# Patient Record
Sex: MALE | Race: WHITE | NOT HISPANIC OR LATINO | Employment: OTHER | URBAN - METROPOLITAN AREA
[De-identification: names, ages, dates, MRNs, and addresses within clinical notes are randomized per-mention and may not be internally consistent; named-entity substitution may affect disease eponyms.]

---

## 2024-11-05 ENCOUNTER — HOSPITAL ENCOUNTER (OUTPATIENT)
Dept: RADIOLOGY | Facility: CLINIC | Age: 79
Discharge: HOME | End: 2024-11-05
Payer: MEDICARE

## 2024-11-05 ENCOUNTER — OFFICE VISIT (OUTPATIENT)
Dept: URGENT CARE | Facility: CLINIC | Age: 79
End: 2024-11-05
Payer: MEDICARE

## 2024-11-05 VITALS
HEART RATE: 73 BPM | TEMPERATURE: 98.1 F | DIASTOLIC BLOOD PRESSURE: 79 MMHG | SYSTOLIC BLOOD PRESSURE: 124 MMHG | OXYGEN SATURATION: 94 % | RESPIRATION RATE: 12 BRPM

## 2024-11-05 DIAGNOSIS — R91.8 ABNORMALITY OF LUNG ON CHEST X-RAY: Primary | ICD-10-CM

## 2024-11-05 DIAGNOSIS — R05.1 ACUTE COUGH: ICD-10-CM

## 2024-11-05 DIAGNOSIS — B34.9 NONSPECIFIC SYNDROME SUGGESTIVE OF VIRAL ILLNESS: ICD-10-CM

## 2024-11-05 LAB
POC CORONAVIRUS 2019 BY PCR (COV19): NOT DETECTED
POC FLU A RESULT: NOT DETECTED
POC FLU B RESULT: NOT DETECTED
POC RSV PCR: NOT DETECTED

## 2024-11-05 PROCEDURE — 71046 X-RAY EXAM CHEST 2 VIEWS: CPT

## 2024-11-05 PROCEDURE — 71046 X-RAY EXAM CHEST 2 VIEWS: CPT | Performed by: RADIOLOGY

## 2024-11-05 PROCEDURE — 99214 OFFICE O/P EST MOD 30 MIN: CPT

## 2024-11-05 PROCEDURE — 99213 OFFICE O/P EST LOW 20 MIN: CPT

## 2024-11-05 RX ORDER — CARVEDILOL 12.5 MG/1
12.5 TABLET ORAL 2 TIMES DAILY
COMMUNITY

## 2024-11-05 RX ORDER — LISINOPRIL 40 MG/1
40 TABLET ORAL
COMMUNITY

## 2024-11-05 RX ORDER — ATORVASTATIN CALCIUM 40 MG/1
40 TABLET, FILM COATED ORAL
COMMUNITY
Start: 2024-02-08

## 2024-11-05 RX ORDER — MULTIVITAMIN
1 TABLET ORAL
COMMUNITY

## 2024-11-05 RX ORDER — CHLORTHALIDONE 25 MG/1
25 TABLET ORAL
COMMUNITY
Start: 2024-02-08

## 2024-11-05 RX ORDER — METFORMIN HYDROCHLORIDE 750 MG/1
750 TABLET, EXTENDED RELEASE ORAL
COMMUNITY
Start: 2024-02-08

## 2024-11-05 RX ORDER — ASPIRIN 81 MG/1
1 TABLET ORAL
COMMUNITY

## 2024-11-05 RX ORDER — AMLODIPINE BESYLATE 5 MG/1
1 TABLET ORAL DAILY
COMMUNITY
Start: 2024-02-08

## 2024-11-05 RX ORDER — TRAMADOL HYDROCHLORIDE 50 MG/1
TABLET ORAL
COMMUNITY
Start: 2024-10-16

## 2024-11-05 RX ORDER — VIT C/E/ZN/COPPR/LUTEIN/ZEAXAN 250MG-90MG
1 CAPSULE ORAL
COMMUNITY

## 2024-11-05 NOTE — LETTER
November 5, 2024     No Assigned PCP Generic Provider, MD  None  [place] [address]    Patient: Jonah Beltran   YOB: 1945   Date of Visit: 11/5/2024        Dear No Assigned PCP Generic Provider, MD:    Your patient, Jonah Beltran was seen in our urgent care department on 11/5/2024. Enclosed is a full report of that visit.    If you have any questions or concerns, please don't hesitate to call.           Sincerely,        Lisa Benton PA-C      CC: No Recipients       Enclosure

## 2024-11-05 NOTE — PROGRESS NOTES
[facility] URGENT CARE MAYKEL NOTE:      Name: Jonah Beltran, 79 y.o.    CSN:[id number]   MRN:[id number]    PCP: No Assigned PCP Generic Provider, MD    ALL:  No Known Allergies    History:    Chief Complaint: Cough, Sore Throat, and weakness (Runny nose x 6 days)    Encounter Date: 11/5/2024      HPI: The history was obtained from the patient. Jonah is a 79 y.o. male, who presents with a chief complaint of Cough, Sore Throat, and weakness (Runny nose x 6 days).  Patient presents with the above symptoms for the last 6 days.  Patient states symptoms have remained persistent.  He states his cough is productive in nature.  He states he has no history of smoking, COPD or asthma.  He denies fevers, chills, nausea, vomiting, chest pain, shortness of breath, abdominal pain.      PMHx:    History reviewed. No pertinent past medical history.           Current Outpatient Medications   Medication Sig Dispense Refill    ALBUTEROL INHL Inhale.      amLODIPine (Norvasc) 5 mg tablet Take 1 tablet (5 mg) by mouth once daily.      aspirin 81 mg EC tablet Take 1 tablet (81 mg) by mouth once daily.      atorvastatin (Lipitor) 40 mg tablet Take 1 tablet (40 mg) by mouth.      carvedilol (Coreg) 12.5 mg tablet Take 1 tablet (12.5 mg) by mouth twice a day.      chlorthalidone (Hygroton) 25 mg tablet Take 1 tablet (25 mg) by mouth.      cholecalciferol (Vitamin D-3) 25 MCG (1000 UT) capsule Take 1 tablet by mouth once daily.      empagliflozin (Jardiance) 25 mg Take 0.5 tablets (12.5 mg) by mouth once daily.      lisinopril 40 mg tablet Take 1 tablet (40 mg) by mouth once daily.      metFORMIN XR (Glucophage-XR) 750 mg 24 hr tablet Take 1 tablet (750 mg) by mouth.      multivitamin tablet Take 1 tablet by mouth once daily.      traMADol (Ultram) 50 mg tablet Take by mouth.       No current facility-administered medications for this visit.         PMSx:    Past Surgical History:   Procedure Laterality Date    TOE SURGERY          Fam Hx: No family history on file.    SOC. Hx:     Social History     Socioeconomic History    Marital status: Single     Spouse name: Not on file    Number of children: Not on file    Years of education: Not on file    Highest education level: Not on file   Occupational History    Not on file   Tobacco Use    Smoking status: Never    Smokeless tobacco: Never   Substance and Sexual Activity    Alcohol use: Not on file    Drug use: Not on file    Sexual activity: Not on file   Other Topics Concern    Not on file   Social History Narrative    Not on file     Social Drivers of Health     Financial Resource Strain: Not on file   Food Insecurity: Not on file   Transportation Needs: Not on file   Physical Activity: Not on file   Stress: Not on file   Social Connections: Not on file   Intimate Partner Violence: Not on file   Housing Stability: Not on file         Vitals:    11/05/24 1403   BP: 124/79   Pulse: 73   Resp: 12   Temp: 36.7 °C (98.1 °F)   SpO2: 94%                Physical Exam  Vitals reviewed.   Constitutional:       General: He is not in acute distress.     Appearance: Normal appearance. He is not ill-appearing.   HENT:      Head: Normocephalic and atraumatic.      Right Ear: External ear normal.      Left Ear: External ear normal.      Nose: Congestion present.      Mouth/Throat:      Mouth: Mucous membranes are moist.      Pharynx: Oropharynx is clear. Posterior oropharyngeal erythema present.   Eyes:      Extraocular Movements: Extraocular movements intact.      Conjunctiva/sclera: Conjunctivae normal.      Pupils: Pupils are equal, round, and reactive to light.   Cardiovascular:      Rate and Rhythm: Normal rate and regular rhythm.      Pulses: Normal pulses.      Heart sounds: Normal heart sounds. No murmur heard.  Pulmonary:      Effort: Pulmonary effort is normal. No respiratory distress.      Breath sounds: Normal breath sounds. No stridor. No wheezing, rhonchi or rales.   Chest:      Chest  wall: No tenderness.   Abdominal:      General: Abdomen is flat.      Palpations: Abdomen is soft.   Musculoskeletal:      Cervical back: Normal range of motion and neck supple. No tenderness.   Lymphadenopathy:      Cervical: No cervical adenopathy.   Skin:     General: Skin is warm.      Capillary Refill: Capillary refill takes less than 2 seconds.      Findings: No rash.   Neurological:      General: No focal deficit present.      Mental Status: He is alert and oriented to person, place, and time.   Psychiatric:         Mood and Affect: Mood normal.         Behavior: Behavior normal.       I did personally review Jonah's past medical history, surgical history, social history, as well as family history (when relevant).  In this case, I also oversaw the his drug management by reviewing his medication list, allergy list, as well as the medications that I prescribed during the UC course and/or recommended as an out-patient (including possible OTC medications such as acetaminophen, NSAIDs , etc).    After reviewing the items above, I did look at previous medical documentation, such as recent hospitalizations, office visits, and/or recent consultations with PCP/specialist.                          SDOH:   Another factor that I considered in Jonah's care was his Social Determinants of Health (SDOH). During this UC encounter, he did not have social determinants of health. Those SDOH influencing Jonah's care are: none    LABORATORY @ RADIOLOGICAL IMAGING (if done):     Study Result    Narrative & Impression   Interpreted By:  Sravanthi Amor,   STUDY:  XR CHEST 2 VIEWS;  11/5/2024 2:38 pm      INDICATION:  Signs/Symptoms:cough x 6 days; SPO2 at 94%.      COMPARISON:  None.      ACCESSION NUMBER(S):  [id number]      ORDERING CLINICIAN:  GRETA CHRISTENSEN      FINDINGS:  The heart is normal in size.      The lung markings are coarse.      There is a 1.4 centimeters nodular density in the left upper lobe.      There is no  obvious pleural fluid.      There is tortuosity of the aorta. There are degenerative changes of  the spine.      COMPARISON OF FINDING:      IMPRESSION:  1.4 centimeters density in the left upper lobe. Correlation with a  chest CT may be useful.      Coarse interstitial lung disease.      MACRO:  none      Signed by: Sravanthi Amor 11/5/2024 2:43 PM  Dictation workstation:   [id number]        COURSE/MEDICAL DECISION MAKING:    Jonah is a 79 y.o., who presents with a working diagnosis of   1. Abnormality of lung on chest x-ray    2. Acute cough    3. Nonspecific syndrome suggestive of viral illness      Jonah was seen today for cough, sore throat and weakness.  Diagnoses and all orders for this visit:  Abnormality of lung on chest x-ray (Primary)  Acute cough  -     XR chest 2 views; Future  -     POCT SARS-COV-2/FLU/RSV PCR SYMPTOMATIC manually resulted  Nonspecific syndrome suggestive of viral illness  Presents with a 6-day onset of cough, sore throat and runny nose.  Patient denies persistent fevers.  He is afebrile in clinic today.  Patient is nontachycardic and nonhypoxic.  Patient does not appear labored or short of breath in patient room.  This x-ray was performed and was notable for a 1.4 cm lesion to the left upper lung lobe.  Patient had a chest x-ray in January 2021 that showed the same lesion at 5 mm.  This finding was discussed with patient at bedside.  Strongly recommend patient follow-up with primary care provider within the next 3 days or sooner as he likely needs further imaging of this finding.  [facility] and he will give them a call in the morning to set up an appointment.    After my independent evaluation, he appears to have a self-limiting illness likely due to a viral sydnrome.   At this time, there is a no evidence of pneumonia, hypoxia, OM, bacterial sinus infection, bacterial bronchitis, bacteremia, or sepsis.     As we discussed, he is to return to  "our office or ER immediately if there is any worsening of his condition, such as increased cough, shortness of breath, persistent fevers, repeated vomiting, dehydration, or if his condition worsens at all.    Susy Benton PA-C   Advanced Practice Provider  [facility] URGENT CARE    Please note: Portions of this chart may have been created with Dragon voice recognition software. Occasional wrong-word or \"sound-like\" substitutions may have occurred due to inherent limitations of the voice recognition software. Please excuse any typographical or grammatical errors contained herein. Please read the chart carefully and recognize, using context, where the substitutions have occurred.   "

## 2024-11-05 NOTE — PATIENT INSTRUCTIONS
Today you were seen for symptoms such as cough, sore throat, runny nose for the last 6 days. A chest xray was performed which showed a 1.5 cm lesion in the left upper lobe of your lung. It appears this lesion has increased in size from a previous chest xray you received in 2021. You should take this information to your primary care provider for prompt follow up. You should be seen by primary care in the next 72 hours or sooner if needed. You should return to our office or ER immediately if there is any worsening of your condition, such as increased cough, shortness of breath, persistent fevers, repeated vomiting, dehydration, or if your condition worsens at all.

## 2024-11-12 ENCOUNTER — OFFICE VISIT (OUTPATIENT)
Dept: URGENT CARE | Facility: CLINIC | Age: 79
End: 2024-11-12
Payer: MEDICARE

## 2024-11-12 VITALS
SYSTOLIC BLOOD PRESSURE: 119 MMHG | BODY MASS INDEX: 37.51 KG/M2 | HEIGHT: 73 IN | RESPIRATION RATE: 16 BRPM | OXYGEN SATURATION: 97 % | DIASTOLIC BLOOD PRESSURE: 71 MMHG | HEART RATE: 84 BPM | TEMPERATURE: 97.7 F | WEIGHT: 283 LBS

## 2024-11-12 DIAGNOSIS — J18.0 BRONCHOPNEUMONIA: Primary | ICD-10-CM

## 2024-11-12 PROCEDURE — 99212 OFFICE O/P EST SF 10 MIN: CPT | Performed by: PHYSICIAN ASSISTANT

## 2024-11-12 RX ORDER — AZITHROMYCIN 250 MG/1
TABLET, FILM COATED ORAL
Qty: 6 TABLET | Refills: 0 | Status: SHIPPED | OUTPATIENT
Start: 2024-11-12 | End: 2024-11-17

## 2024-11-12 NOTE — PROGRESS NOTES
[facility] URGENT CARE   MAYKEL NOTE:      Name: Jonah Beltran, 79 y.o.    CSN:[id number]   MRN:[id number]    PCP: No Assigned PCP Generic Provider, MD    ALL:  No Known Allergies    History:    Chief Complaint: Nasal Congestion (Congestion and sinus pressure for 2 weeks.)    Encounter Date: 11/12/2024      HPI: The history was obtained from the patient. Jonah is a 79 y.o. male, who presents with a chief complaint of Nasal Congestion (Congestion and sinus pressure for 2 weeks.) he was here 11 5, had x-ray performed not showing any signs of pneumonia, has had a progressive cough is now productive green-brownish sputum, denies any active fevers or notable exertional dyspnea or peripheral edema.    PMHx:    No past medical history on file.           Current Outpatient Medications   Medication Sig Dispense Refill    ALBUTEROL INHL Inhale.      amLODIPine (Norvasc) 5 mg tablet Take 1 tablet (5 mg) by mouth once daily.      aspirin 81 mg EC tablet Take 1 tablet (81 mg) by mouth once daily.      atorvastatin (Lipitor) 40 mg tablet Take 1 tablet (40 mg) by mouth.      azithromycin (Zithromax) 250 mg tablet Take 2 tablets (500 mg) on  Day 1,  followed by 1 tablet (250 mg) once daily on Days 2 through 5. 6 tablet 0    carvedilol (Coreg) 12.5 mg tablet Take 1 tablet (12.5 mg) by mouth twice a day.      chlorthalidone (Hygroton) 25 mg tablet Take 1 tablet (25 mg) by mouth.      cholecalciferol (Vitamin D-3) 25 MCG (1000 UT) capsule Take 1 tablet by mouth once daily.      empagliflozin (Jardiance) 25 mg Take 0.5 tablets (12.5 mg) by mouth once daily.      lisinopril 40 mg tablet Take 1 tablet (40 mg) by mouth once daily.      metFORMIN XR (Glucophage-XR) 750 mg 24 hr tablet Take 1 tablet (750 mg) by mouth.      multivitamin tablet Take 1 tablet by mouth once daily.      traMADol (Ultram) 50 mg tablet Take by mouth.       No current facility-administered medications for this visit.         PMSx:    Past  Surgical History:   Procedure Laterality Date    TOE SURGERY         Fam Hx: No family history on file.    SOC. Hx:     Social History     Socioeconomic History    Marital status: Single     Spouse name: Not on file    Number of children: Not on file    Years of education: Not on file    Highest education level: Not on file   Occupational History    Not on file   Tobacco Use    Smoking status: Never    Smokeless tobacco: Never   Substance and Sexual Activity    Alcohol use: Not on file    Drug use: Not on file    Sexual activity: Not on file   Other Topics Concern    Not on file   Social History Narrative    Not on file     Social Drivers of Health     Financial Resource Strain: Not on file   Food Insecurity: Not on file   Transportation Needs: Not on file   Physical Activity: Not on file   Stress: Not on file   Social Connections: Not on file   Intimate Partner Violence: Not on file   Housing Stability: Not on file         Vitals:    11/12/24 1359   BP: 119/71   Pulse: 84   Resp: 16   Temp: 36.5 °C (97.7 °F)   SpO2: 97%     128 kg (283 lb)          Physical Exam  Vitals reviewed.   Constitutional:       Appearance: Normal appearance. He is obese.   HENT:      Head: Normocephalic and atraumatic.      Nose: Nose normal.      Mouth/Throat:      Mouth: Mucous membranes are moist.   Eyes:      Extraocular Movements: Extraocular movements intact.   Cardiovascular:      Rate and Rhythm: Normal rate and regular rhythm.   Pulmonary:      Effort: Pulmonary effort is normal.      Breath sounds: Normal breath sounds.   Abdominal:      General: Abdomen is flat.   Musculoskeletal:         General: Normal range of motion.      Cervical back: Normal range of motion and neck supple.      Right lower leg: No edema.      Left lower leg: No edema.   Skin:     General: Skin is warm.      Findings: No rash.   Neurological:      Mental Status: He is alert and oriented to person, place, and time.   Psychiatric:         Behavior: Behavior  normal.           LABORATORY @ RADIOLOGICAL IMAGING (if done):     Reviewed x-ray from 11/5/2044.  ____________________________________________________________________    I did personally review Jonah's past medical history, surgical history, social history, as well as family history (when relevant).  In this case, I also oversaw the his drug management by reviewing his medication list, allergy list, as well as the medications that I prescribed during the UC course and/or recommended as an out-patient (including possible OTC medications such as acetaminophen, NSAIDs , etc).    After reviewing the items above, I did look at previous medical documentation, such as recent hospitalizations, office visits, and/or recent consultations with PCP/specialist.                          SDOH:   Another factor that I considered in Jonah's care was his Social Determinants of Health (SDOH). During this UC encounter, he did not have social determinants of health. Those SDOH influencing Jonah's care are: none      _____________________________________________________________________      UC COURSE/MEDICAL DECISION MAKING:    Jonah is a 79 y.o., who presents with a working diagnosis of   1. Bronchopneumonia      Current plan is to treat with Zithromax, encouraged use of Mucinex over-the-counter, he was agreeable to this plan, vital signs reviewed patient is generally well-appearing would do well as an outpatient.  We discussed discharge.      Corey Herrera PA-C   Advanced Practice Provider  [facility] URGENT CARE    Please note: While the patient may or may not have received printed discharge paperwork, all relevant medical findings, test results, and treatment details are accessible through the electronic medical record system. The patient is encouraged to review their chart via the patient portal for comprehensive information and follow-up instructions.

## 2025-02-25 ENCOUNTER — OFFICE VISIT (OUTPATIENT)
Dept: URGENT CARE | Facility: CLINIC | Age: 80
End: 2025-02-25
Payer: MEDICARE

## 2025-02-25 VITALS
DIASTOLIC BLOOD PRESSURE: 70 MMHG | OXYGEN SATURATION: 95 % | HEART RATE: 69 BPM | TEMPERATURE: 97.8 F | RESPIRATION RATE: 14 BRPM | SYSTOLIC BLOOD PRESSURE: 122 MMHG

## 2025-02-25 DIAGNOSIS — R30.0 DYSURIA: ICD-10-CM

## 2025-02-25 DIAGNOSIS — N30.01 ACUTE CYSTITIS WITH HEMATURIA: Primary | ICD-10-CM

## 2025-02-25 LAB
POC APPEARANCE, URINE: CLEAR
POC BILIRUBIN, URINE: NEGATIVE
POC BLOOD, URINE: ABNORMAL
POC COLOR, URINE: ABNORMAL
POC GLUCOSE, URINE: ABNORMAL MG/DL
POC KETONES, URINE: NEGATIVE MG/DL
POC LEUKOCYTES, URINE: ABNORMAL
POC NITRITE,URINE: NEGATIVE
POC PH, URINE: 5.5 PH
POC PROTEIN, URINE: ABNORMAL MG/DL
POC SPECIFIC GRAVITY, URINE: 1.02
POC UROBILINOGEN, URINE: 0.2 EU/DL

## 2025-02-25 PROCEDURE — 81002 URINALYSIS NONAUTO W/O SCOPE: CPT | Performed by: PHYSICIAN ASSISTANT

## 2025-02-25 PROCEDURE — 99212 OFFICE O/P EST SF 10 MIN: CPT | Performed by: PHYSICIAN ASSISTANT

## 2025-02-25 RX ORDER — CEFUROXIME AXETIL 250 MG/1
250 TABLET ORAL 2 TIMES DAILY
Qty: 14 TABLET | Refills: 0 | Status: SHIPPED | OUTPATIENT
Start: 2025-02-25 | End: 2025-03-04

## 2025-02-25 NOTE — PROGRESS NOTES
[facility] URGENT CARE   MAYKEL NOTE:      Name: Jonah Beltran, 79 y.o.    CSN:[id number]   MRN:[id number]    PCP: No Assigned PCP Generic Provider, MD    ALL:  No Known Allergies    History:    Chief Complaint: UTI (Decreased stream, dysuria x 3 days )    Encounter Date: 2/25/2025  13:40    HPI: The history was obtained from the patient. Jonah is a 79 y.o. male, who presents with a chief complaint of UTI (Decreased stream, dysuria x 3 days )    PMHx:    No past medical history on file.           Current Outpatient Medications   Medication Sig Dispense Refill    ALBUTEROL INHL Inhale.      amLODIPine (Norvasc) 5 mg tablet Take 1 tablet (5 mg) by mouth once daily.      aspirin 81 mg EC tablet Take 1 tablet (81 mg) by mouth once daily.      atorvastatin (Lipitor) 40 mg tablet Take 1 tablet (40 mg) by mouth.      carvedilol (Coreg) 12.5 mg tablet Take 1 tablet (12.5 mg) by mouth twice a day.      chlorthalidone (Hygroton) 25 mg tablet Take 1 tablet (25 mg) by mouth.      cholecalciferol (Vitamin D-3) 25 MCG (1000 UT) capsule Take 1 tablet by mouth once daily.      empagliflozin (Jardiance) 25 mg Take 0.5 tablets (12.5 mg) by mouth once daily.      lisinopril 40 mg tablet Take 1 tablet (40 mg) by mouth once daily.      metFORMIN XR (Glucophage-XR) 750 mg 24 hr tablet Take 1 tablet (750 mg) by mouth.      multivitamin tablet Take 1 tablet by mouth once daily.      traMADol (Ultram) 50 mg tablet Take by mouth.      cefuroxime (Ceftin) 250 mg tablet Take 1 tablet (250 mg) by mouth 2 times a day for 7 days. 14 tablet 0     No current facility-administered medications for this visit.         PMSx:    Past Surgical History:   Procedure Laterality Date    TOE SURGERY         Fam Hx: No family history on file.    SOC. Hx:     Social History     Socioeconomic History    Marital status: Single     Spouse name: Not on file    Number of children: Not on file    Years of education: Not on file    Highest education  level: Not on file   Occupational History    Not on file   Tobacco Use    Smoking status: Never    Smokeless tobacco: Never   Substance and Sexual Activity    Alcohol use: Not on file    Drug use: Not on file    Sexual activity: Not on file   Other Topics Concern    Not on file   Social History Narrative    Not on file     Social Drivers of Health     Financial Resource Strain: Not on file   Food Insecurity: Not on file   Transportation Needs: Not on file   Physical Activity: Not on file   Stress: Not on file   Social Connections: Not on file   Intimate Partner Violence: Not on file   Housing Stability: Not on file         Vitals:    02/25/25 1317   BP: 122/70   Pulse: 69   Resp: 14   Temp: 36.6 °C (97.8 °F)   SpO2: 95%                Physical Exam  Vitals reviewed.   Constitutional:       Appearance: Normal appearance. He is normal weight.   HENT:      Head: Normocephalic and atraumatic.   Eyes:      Extraocular Movements: Extraocular movements intact.      Pupils: Pupils are equal, round, and reactive to light.   Pulmonary:      Effort: Pulmonary effort is normal.   Abdominal:      General: Abdomen is flat. Bowel sounds are normal. There is no distension.   Genitourinary:     Comments: Bladder scan 68mL postvoid  Musculoskeletal:         General: Normal range of motion.      Cervical back: Normal range of motion.   Skin:     General: Skin is warm.      Capillary Refill: Capillary refill takes less than 2 seconds.   Neurological:      Mental Status: He is alert.           LABORATORY @ RADIOLOGICAL IMAGING (if done):     Results for orders placed or performed in visit on 02/25/25 (from the past 24 hours)   POCT UA (nonautomated w/o microscopy) manually resulted   Result Value Ref Range    POC Color, Urine Other (A) Straw, Yellow, Light-Yellow    POC Appearance, Urine Clear Clear    POC Glucose, Urine >=1000 (4+) (A) NEGATIVE mg/dl    POC Bilirubin, Urine NEGATIVE NEGATIVE    POC Ketones, Urine NEGATIVE NEGATIVE  mg/dl    POC Specific Gravity, Urine 1.020 1.005 - 1.035    POC Blood, Urine LARGE (3+) (A) NEGATIVE    POC PH, Urine 5.5 No Reference Range Established PH    POC Protein, Urine 100 (2+) (A) NEGATIVE mg/dl    POC Urobilinogen, Urine 0.2 0.2, 1.0 EU/DL    Poc Nitrite, Urine NEGATIVE NEGATIVE    POC Leukocytes, Urine TRACE (A) NEGATIVE       ____________________________________________________________________    I did personally review Jonah's past medical history, surgical history, social history, as well as family history (when relevant).  In this case, I also oversaw the his drug management by reviewing his medication list, allergy list, as well as the medications that I prescribed during the UC course and/or recommended as an out-patient (including possible OTC medications such as acetaminophen, NSAIDs , etc).    After reviewing the items above, I did not look at previous medical documentation, such as recent hospitalizations, office visits, and/or recent consultations with PCP/specialist.                          SDOH:   Another factor that I considered in Jonah's care was his Social Determinants of Health (SDOH). During this UC encounter, he did not have social determinants of health. Those SDOH influencing Jonah's care are: none      _____________________________________________________________________      UC COURSE/MEDICAL DECISION MAKING:    Jonah is a 79 y.o., who presents with a working diagnosis of   1. Acute cystitis with hematuria    2. Dysuria      Tx with ceftin BID x7d, await culture results; bladder scan reveals no AUR      Corey Herrera PA-C   Advanced Practice Provider  [facility] URGENT CARE    Please note: While the patient may or may not have received printed discharge paperwork, all relevant medical findings, test results, and treatment details are accessible through the electronic medical record system. The patient is encouraged to review their chart via the  patient portal for comprehensive information and follow-up instructions.

## 2025-02-27 ENCOUNTER — TELEPHONE (OUTPATIENT)
Dept: URGENT CARE | Facility: CLINIC | Age: 80
End: 2025-02-27
Payer: MEDICARE

## 2025-02-27 LAB — BACTERIA UR CULT: ABNORMAL

## 2025-02-27 NOTE — TELEPHONE ENCOUNTER
Result Communication    No results found from the In Basket message.    2:22 PM      Results were not successfully communicated with the patient and they acknowledged their understanding.

## 2025-02-27 NOTE — RESULT ENCOUNTER NOTE
Please call the patient regarding his abnormal result.  Please indicate he is on appropriate medication to cover for this bacteria.

## 2025-02-28 ENCOUNTER — TELEPHONE (OUTPATIENT)
Dept: URGENT CARE | Facility: CLINIC | Age: 80
End: 2025-02-28
Payer: MEDICARE

## 2025-02-28 NOTE — TELEPHONE ENCOUNTER
Result Communication    No results found from the In Basket message.    12:03 PM      Results were successfully communicated with the patient and they acknowledged their understanding.

## 2025-03-18 ENCOUNTER — OFFICE VISIT (OUTPATIENT)
Dept: URGENT CARE | Facility: CLINIC | Age: 80
End: 2025-03-18
Payer: MEDICARE

## 2025-03-18 VITALS
DIASTOLIC BLOOD PRESSURE: 64 MMHG | SYSTOLIC BLOOD PRESSURE: 108 MMHG | BODY MASS INDEX: 37.51 KG/M2 | OXYGEN SATURATION: 97 % | TEMPERATURE: 97.7 F | HEIGHT: 73 IN | WEIGHT: 283 LBS | HEART RATE: 70 BPM | RESPIRATION RATE: 16 BRPM

## 2025-03-18 DIAGNOSIS — N30.01 ACUTE CYSTITIS WITH HEMATURIA: ICD-10-CM

## 2025-03-18 DIAGNOSIS — R30.0 DYSURIA: Primary | ICD-10-CM

## 2025-03-18 LAB
POC APPEARANCE, URINE: ABNORMAL
POC BILIRUBIN, URINE: NEGATIVE
POC BLOOD, URINE: ABNORMAL
POC COLOR, URINE: YELLOW
POC GLUCOSE, URINE: ABNORMAL MG/DL
POC KETONES, URINE: NEGATIVE MG/DL
POC LEUKOCYTES, URINE: ABNORMAL
POC NITRITE,URINE: NEGATIVE
POC PH, URINE: 6 PH
POC PROTEIN, URINE: ABNORMAL MG/DL
POC SPECIFIC GRAVITY, URINE: 1.02
POC UROBILINOGEN, URINE: 0.2 EU/DL

## 2025-03-18 PROCEDURE — 99212 OFFICE O/P EST SF 10 MIN: CPT | Performed by: PHYSICIAN ASSISTANT

## 2025-03-18 PROCEDURE — 81002 URINALYSIS NONAUTO W/O SCOPE: CPT | Performed by: PHYSICIAN ASSISTANT

## 2025-03-18 RX ORDER — TAMSULOSIN HYDROCHLORIDE 0.4 MG/1
0.4 CAPSULE ORAL DAILY
Qty: 15 CAPSULE | Refills: 0 | Status: SHIPPED | OUTPATIENT
Start: 2025-03-18 | End: 2025-04-02

## 2025-03-18 RX ORDER — PENICILLIN V POTASSIUM 500 MG/1
500 TABLET, FILM COATED ORAL 3 TIMES DAILY
Qty: 30 TABLET | Refills: 0 | Status: SHIPPED | OUTPATIENT
Start: 2025-03-18 | End: 2025-03-28

## 2025-03-18 NOTE — PROGRESS NOTES
"[facility] URGENT CARE   MAYKEL NOTE:      Name: Jonah Betlran, 79 y.o.    CSN:[id number]   MRN:[id number]    PCP: No Assigned PCP Generic Provider, MD    ALL:  No Known Allergies    History:    Chief Complaint: UTI (Pt has a UTI that has not gone away since his last visit )    Encounter Date: 3/18/2025  ***    HPI: The history was obtained from the {[facility] HISTORIAN:[id number]::\"patient\"}. Jonah is a 79 y.o. male, who presents with a chief complaint of UTI (Pt has a UTI that has not gone away since his last visit ) ***    PMHx:    No past medical history on file.           Current Outpatient Medications   Medication Sig Dispense Refill   • ALBUTEROL INHL Inhale.     • amLODIPine (Norvasc) 5 mg tablet Take 1 tablet (5 mg) by mouth once daily.     • aspirin 81 mg EC tablet Take 1 tablet (81 mg) by mouth once daily.     • atorvastatin (Lipitor) 40 mg tablet Take 1 tablet (40 mg) by mouth.     • carvedilol (Coreg) 12.5 mg tablet Take 1 tablet (12.5 mg) by mouth twice a day.     • chlorthalidone (Hygroton) 25 mg tablet Take 1 tablet (25 mg) by mouth.     • cholecalciferol (Vitamin D-3) 25 MCG (1000 UT) capsule Take 1 tablet by mouth once daily.     • empagliflozin (Jardiance) 25 mg Take 0.5 tablets (12.5 mg) by mouth once daily.     • lisinopril 40 mg tablet Take 1 tablet (40 mg) by mouth once daily.     • metFORMIN XR (Glucophage-XR) 750 mg 24 hr tablet Take 1 tablet (750 mg) by mouth.     • multivitamin tablet Take 1 tablet by mouth once daily.     • traMADol (Ultram) 50 mg tablet Take by mouth.       No current facility-administered medications for this visit.         PMSx:    Past Surgical History:   Procedure Laterality Date   • TOE SURGERY         Fam Hx: No family history on file.    SOC. Hx:     Social History     Socioeconomic History   • Marital status: Single     Spouse name: Not on file   • Number of children: Not on file   • Years of education: Not on file   • Highest education level: Not on file "   Occupational History   • Not on file   Tobacco Use   • Smoking status: Never   • Smokeless tobacco: Never   Substance and Sexual Activity   • Alcohol use: Not on file   • Drug use: Not on file   • Sexual activity: Not on file   Other Topics Concern   • Not on file   Social History Narrative   • Not on file     Social Drivers of Health     Financial Resource Strain: Not on file   Food Insecurity: Not on file   Transportation Needs: Not on file   Physical Activity: Not on file   Stress: Not on file   Social Connections: Not on file   Intimate Partner Violence: Not on file   Housing Stability: Not on file         Vitals:    03/18/25 1325   BP: 108/64   Pulse: 70   Resp: 16   Temp: 36.5 °C (97.7 °F)   SpO2: 97%     128 kg (283 lb)          Physical Exam  ***    LABORATORY @ RADIOLOGICAL IMAGING (if done):     No results found for this or any previous visit (from the past 24 hours).    ____________________________________________________________________    I did personally review Jonah's past medical history, surgical history, social history, as well as family history (when relevant).  In this case, I also oversaw the his drug management by reviewing his medication list, allergy list, as well as the medications that I prescribed during the UC course and/or recommended as an out-patient (including possible OTC medications such as acetaminophen, NSAIDs , etc).    After reviewing the items above, I {DID/DID NOT:64648} look at previous medical documentation, such as recent hospitalizations, office visits, and/or recent consultations with PCP/specialist.                          SDOH:   Another factor that I considered in Jonah's care was his Social Determinants of Health (SDOH). During this UC encounter, he {DID/DID NOT:16448} have social determinants of health. Those SDOH influencing Jonah's care are: {pkvsdo:09174}      _____________________________________________________________________      UC COURSE/MEDICAL DECISION  MAKING:    Jonah is a 79 y.o., who presents with a working diagnosis of   1. Dysuria     with a differential to include:         Corey Herrera PA-C   Advanced Practice Provider  [facility] URGENT CARE    Please note: While the patient may or may not have received printed discharge paperwork, all relevant medical findings, test results, and treatment details are accessible through the electronic medical record system. The patient is encouraged to review their chart via the patient portal for comprehensive information and follow-up instructions.

## 2025-03-18 NOTE — PROGRESS NOTES
[facility] URGENT CARE MAYKEL NOTE:      Name: Jonah Beltran, 79 y.o.    CSN:[id number]   MRN:[id number]    PCP: No Assigned PCP Generic Provider, MD    ALL:  No Known Allergies    History:    Chief Complaint: UTI (Pt has a UTI that has not gone away since his last visit )    Encounter Date: 3/18/2025  6:29 PM      HPI: The history was obtained from the patient. Jonah is a 79 y.o. male,  who presents with a chief complaint of a urinary tract infection (UTI) that has not resolved since his last visit. He reports that the antibiotics prescribed during his previous visit provided relief for a few days, but the symptoms returned. patient describes pain during the initiation of urination and a sensation of incomplete bladder emptying. He also complains of urinary urgency and frequency. he denies hematuria, fever, or chills.    PMHx:    No past medical history on file.           Current Outpatient Medications   Medication Sig Dispense Refill    ALBUTEROL INHL Inhale.      amLODIPine (Norvasc) 5 mg tablet Take 1 tablet (5 mg) by mouth once daily.      aspirin 81 mg EC tablet Take 1 tablet (81 mg) by mouth once daily.      atorvastatin (Lipitor) 40 mg tablet Take 1 tablet (40 mg) by mouth.      carvedilol (Coreg) 12.5 mg tablet Take 1 tablet (12.5 mg) by mouth twice a day.      chlorthalidone (Hygroton) 25 mg tablet Take 1 tablet (25 mg) by mouth.      cholecalciferol (Vitamin D-3) 25 MCG (1000 UT) capsule Take 1 tablet by mouth once daily.      empagliflozin (Jardiance) 25 mg Take 0.5 tablets (12.5 mg) by mouth once daily.      lisinopril 40 mg tablet Take 1 tablet (40 mg) by mouth once daily.      metFORMIN XR (Glucophage-XR) 750 mg 24 hr tablet Take 1 tablet (750 mg) by mouth.      multivitamin tablet Take 1 tablet by mouth once daily.      penicillin v potassium (Veetid) 500 mg tablet Take 1 tablet (500 mg) by mouth 3 times a day for 10 days. 30 tablet 0    tamsulosin (Flomax) 0.4 mg 24 hr capsule  Take 1 capsule (0.4 mg) by mouth once daily for 15 days. 15 capsule 0    traMADol (Ultram) 50 mg tablet Take by mouth.       No current facility-administered medications for this visit.         PMSx:    Past Surgical History:   Procedure Laterality Date    TOE SURGERY         Fam Hx: No family history on file.    SOC. Hx:     Social History     Socioeconomic History    Marital status: Single     Spouse name: Not on file    Number of children: Not on file    Years of education: Not on file    Highest education level: Not on file   Occupational History    Not on file   Tobacco Use    Smoking status: Never    Smokeless tobacco: Never   Substance and Sexual Activity    Alcohol use: Not on file    Drug use: Not on file    Sexual activity: Not on file   Other Topics Concern    Not on file   Social History Narrative    Not on file     Social Drivers of Health     Financial Resource Strain: Not on file   Food Insecurity: Not on file   Transportation Needs: Not on file   Physical Activity: Not on file   Stress: Not on file   Social Connections: Not on file   Intimate Partner Violence: Not on file   Housing Stability: Not on file         Vitals:    03/18/25 1325   BP: 108/64   Pulse: 70   Resp: 16   Temp: 36.5 °C (97.7 °F)   SpO2: 97%     128 kg (283 lb)          Physical Exam  Constitutional:       Appearance: Normal appearance.   HENT:      Head: Normocephalic and atraumatic.   Eyes:      Extraocular Movements: Extraocular movements intact.   Cardiovascular:      Rate and Rhythm: Normal rate and regular rhythm.   Pulmonary:      Effort: Pulmonary effort is normal.      Breath sounds: Normal breath sounds.   Abdominal:      General: Abdomen is flat. There is no distension.      Palpations: Abdomen is soft.   Musculoskeletal:      Cervical back: Normal range of motion and neck supple.   Skin:     General: Skin is warm and dry.   Neurological:      General: No focal deficit present.      Mental Status: He is alert and oriented  to person, place, and time.   Psychiatric:         Mood and Affect: Mood normal.         LABORATORY @ RADIOLOGICAL IMAGING (if done):     Results for orders placed or performed in visit on 03/18/25 (from the past 24 hours)   POCT UA (nonautomated w/o microscopy) manually resulted   Result Value Ref Range    POC Color, Urine Yellow Straw, Yellow, Light-Yellow    POC Appearance, Urine Cloudy (A) Clear    POC Glucose, Urine 500 (3+) (A) NEGATIVE mg/dl    POC Bilirubin, Urine NEGATIVE NEGATIVE    POC Ketones, Urine NEGATIVE NEGATIVE mg/dl    POC Specific Gravity, Urine 1.025 1.005 - 1.035    POC Blood, Urine LARGE (3+) (A) NEGATIVE    POC PH, Urine 6.0 No Reference Range Established PH    POC Protein, Urine >=300 (3+) (A) NEGATIVE mg/dl    POC Urobilinogen, Urine 0.2 0.2, 1.0 EU/DL    Poc Nitrite, Urine NEGATIVE NEGATIVE    POC Leukocytes, Urine SMALL (1+) (A) NEGATIVE       ____________________________________________________________________    I did personally review Jonah's past medical history, surgical history, social history, as well as family history (when relevant).  In this case, I also oversaw the his drug management by reviewing his medication list, allergy list, as well as the medications that I prescribed during the UC course and/or recommended as an out-patient (including possible OTC medications such as acetaminophen, NSAIDs , etc).    After reviewing the items above, I did look at previous medical documentation, such as recent hospitalizations, office visits, and/or recent consultations with PCP/specialist.                          SDOH:   Another factor that I considered in Jonah's care was his Social Determinants of Health (SDOH). During this UC encounter, he did not have social determinants of health. Those SDOH influencing Jonah's care are: none      UC COURSE/MEDICAL DECISION MAKING:    Jonah is a 79 y.o., who presents with a working diagnosis of   1. Dysuria    2. Acute cystitis with hematuria      with a differential to include: UTI, nephrolithiasis, BPH, pyelonephritis, KI, aglomerular disease, prostate cancer, bladder cancer    Plan:   Flomax sent to the pharmacy to help with possible BPH, as patient symptoms of urgency and frequency are suggestive of BPH. Advised to see a urologist and KUB US suggested for further evaluation and possible abnormalities.   POC UA positive for blood and leukocytes, nitrates negative. Urine culture results from previous culture showed streptococcus and penicillin prescribed for better coverage.      I, YOAN Shah student, helped prepare the medical record for my supervising clinician, Corey Herrera PA-C.   AUDREY Shah, [facility]    Supervised by  Corey Herrera PA-C   Advanced Practice Provider  [facility] URGENT CARE    I was present with the YOAN whitley who participated in the documentation of this note. I have personally seen and re-examined the patient and performed the medical decision-making components (assessment and plan of care). I have reviewed the PA student documentation and verified the findings in the note as written with additions or exceptions as stated in the body of this note.    TITA Hill

## 2025-03-20 ENCOUNTER — TELEPHONE (OUTPATIENT)
Dept: URGENT CARE | Facility: CLINIC | Age: 80
End: 2025-03-20
Payer: MEDICARE

## 2025-03-20 DIAGNOSIS — B37.49 CANDIDIASIS, UROGENITAL: Primary | ICD-10-CM

## 2025-03-20 LAB — BACTERIA UR CULT: ABNORMAL

## 2025-03-20 RX ORDER — FLUCONAZOLE 150 MG/1
150 TABLET ORAL ONCE
Qty: 1 TABLET | Refills: 0 | Status: SHIPPED | OUTPATIENT
Start: 2025-03-20 | End: 2025-03-20

## 2025-03-20 NOTE — RESULT ENCOUNTER NOTE
Please call the patient regarding his abnormal result. Yeast growth seen, will need to tx with Diflucan x1 dose. This may need to be repeated if it persists.

## 2025-03-20 NOTE — TELEPHONE ENCOUNTER
Attempted to reach pt regarding results. No answer, left VM to return call.     ----- Message from Corey Herrera sent at 3/20/2025 10:34 AM EDT -----  Please call the patient regarding his abnormal result. Yeast growth seen, will need to tx with Diflucan x1 dose. This may need to be repeated if it persists.

## 2025-03-21 ENCOUNTER — TELEPHONE (OUTPATIENT)
Dept: URGENT CARE | Facility: CLINIC | Age: 80
End: 2025-03-21
Payer: MEDICARE

## 2025-03-21 NOTE — TELEPHONE ENCOUNTER
----- Message from Corey Herrera sent at 3/20/2025 10:34 AM EDT -----  Please call the patient regarding his abnormal result. Yeast growth seen, will need to tx with Diflucan x1 dose. This may need to be repeated if it persists.    Patient returned phone call, notified patient about lab results and a prescription sent to pharmacy.